# Patient Record
Sex: FEMALE | URBAN - METROPOLITAN AREA
[De-identification: names, ages, dates, MRNs, and addresses within clinical notes are randomized per-mention and may not be internally consistent; named-entity substitution may affect disease eponyms.]

---

## 2020-06-17 ENCOUNTER — TELEPHONE ENCOUNTER (OUTPATIENT)
Dept: URBAN - METROPOLITAN AREA CLINIC 35 | Facility: CLINIC | Age: 55
End: 2020-06-17

## 2020-06-17 RX ORDER — FAMOTIDINE 20 MG/1
1 TABLET AT BEDTIME AS NEEDED TABLET, FILM COATED ORAL ONCE A DAY
Qty: 90 TABLET | Refills: 1 | OUTPATIENT
Start: 2020-01-07

## 2020-09-03 ENCOUNTER — OFFICE VISIT (OUTPATIENT)
Dept: URBAN - METROPOLITAN AREA CLINIC 35 | Facility: CLINIC | Age: 55
End: 2020-09-03

## 2020-10-08 ENCOUNTER — OFFICE VISIT (OUTPATIENT)
Dept: URBAN - METROPOLITAN AREA CLINIC 35 | Facility: CLINIC | Age: 55
End: 2020-10-08

## 2020-10-08 NOTE — HPI-MIGRATED HPI
;     Gastroesophageal reflux disease : Patient presents today for follow up of GERD. She is currently taking Famotidine at bed time and Omeprazole 40 mg as needed.   She admits/denies--   Last visit (3/2/2020) Patient admits improvement of GERD. She is on Omeprazole 40 mg in AM  and Famotidine 20 mg at bedtime. Patient has significantly decreased the amount of ETOH she was consuming and since, her GERD symptoms have significantly decreased to the point she takes only her Famotidine at bedtime and takes Omeprazole only as needed.  Patient could not do EGD because of the cost.  Last visit (1/7/2020) Patient does admit symptoms of bloating, belching, early satiety, and heartburn for several months. She is currently taking Omeprazole 40 mg one tab daily since October, which does help with relief of her symptoms. Patient states that she will have to lay down after she eats, because after meals she will feel very tire, which can exacerbate her symptoms. This is better since she started Omeprazole.  She did experience some nausea/vomiting last week. She will take Zofran prn which helps with relief of nausea/vomiting.   She admits some weight loss, because she only eats one meal a day. She has also been under a lot of stress lately. Patient has been drinking 1 bottle of wine a day for last year.  She does admit symptoms of early satiety and bloating. She states that she will only eat one meal per day and feel full and tired after she eats.  She admit previous history of gastric ulcer in the late 80's. She had an EGD at this time. ;